# Patient Record
Sex: MALE | Race: WHITE
[De-identification: names, ages, dates, MRNs, and addresses within clinical notes are randomized per-mention and may not be internally consistent; named-entity substitution may affect disease eponyms.]

---

## 2018-02-17 ENCOUNTER — HOSPITAL ENCOUNTER (EMERGENCY)
Dept: HOSPITAL 17 - NEPE | Age: 83
Discharge: HOME | End: 2018-02-17
Payer: COMMERCIAL

## 2018-02-17 VITALS — RESPIRATION RATE: 16 BRPM | SYSTOLIC BLOOD PRESSURE: 123 MMHG | DIASTOLIC BLOOD PRESSURE: 59 MMHG

## 2018-02-17 VITALS
OXYGEN SATURATION: 98 % | SYSTOLIC BLOOD PRESSURE: 104 MMHG | DIASTOLIC BLOOD PRESSURE: 70 MMHG | RESPIRATION RATE: 16 BRPM | TEMPERATURE: 98.6 F | HEART RATE: 88 BPM

## 2018-02-17 VITALS — SYSTOLIC BLOOD PRESSURE: 113 MMHG | DIASTOLIC BLOOD PRESSURE: 59 MMHG

## 2018-02-17 VITALS — BODY MASS INDEX: 22.4 KG/M2 | HEIGHT: 72 IN | WEIGHT: 165.35 LBS

## 2018-02-17 DIAGNOSIS — I48.92: ICD-10-CM

## 2018-02-17 DIAGNOSIS — I48.91: ICD-10-CM

## 2018-02-17 DIAGNOSIS — R42: ICD-10-CM

## 2018-02-17 DIAGNOSIS — R53.1: ICD-10-CM

## 2018-02-17 DIAGNOSIS — I10: ICD-10-CM

## 2018-02-17 DIAGNOSIS — R11.2: ICD-10-CM

## 2018-02-17 DIAGNOSIS — R55: Primary | ICD-10-CM

## 2018-02-17 LAB
ALBUMIN SERPL-MCNC: 3.3 GM/DL (ref 3.4–5)
ALP SERPL-CCNC: 58 U/L (ref 45–117)
ALT SERPL-CCNC: 19 U/L (ref 12–78)
AMORPHOUS SEDIMENT, URINE: (no result)
AST SERPL-CCNC: 19 U/L (ref 15–37)
BASOPHILS # BLD AUTO: 0.1 TH/MM3 (ref 0–0.2)
BASOPHILS NFR BLD: 0.9 % (ref 0–2)
BILIRUB SERPL-MCNC: 0.5 MG/DL (ref 0.2–1)
BUN SERPL-MCNC: 15 MG/DL (ref 7–18)
CALCIUM SERPL-MCNC: 9.1 MG/DL (ref 8.5–10.1)
CHLORIDE SERPL-SCNC: 105 MEQ/L (ref 98–107)
COLOR UR: YELLOW
CREAT SERPL-MCNC: 1.02 MG/DL (ref 0.6–1.3)
EOSINOPHIL # BLD: 0.3 TH/MM3 (ref 0–0.4)
EOSINOPHIL NFR BLD: 4.2 % (ref 0–4)
ERYTHROCYTE [DISTWIDTH] IN BLOOD BY AUTOMATED COUNT: 13.5 % (ref 11.6–17.2)
GFR SERPLBLD BASED ON 1.73 SQ M-ARVRAT: 70 ML/MIN (ref 89–?)
GLUCOSE SERPL-MCNC: 118 MG/DL (ref 74–106)
GLUCOSE UR STRIP-MCNC: (no result) MG/DL
HCO3 BLD-SCNC: 28.5 MEQ/L (ref 21–32)
HCT VFR BLD CALC: 42.7 % (ref 39–51)
HGB BLD-MCNC: 14.6 GM/DL (ref 13–17)
HGB UR QL STRIP: (no result)
HYALINE CASTS #/AREA URNS LPF: 8 /LPF
KETONES UR STRIP-MCNC: (no result) MG/DL
LYMPHOCYTES # BLD AUTO: 1.2 TH/MM3 (ref 1–4.8)
LYMPHOCYTES NFR BLD AUTO: 17.3 % (ref 9–44)
MAGNESIUM SERPL-MCNC: 2.3 MG/DL (ref 1.5–2.5)
MCH RBC QN AUTO: 32.6 PG (ref 27–34)
MCHC RBC AUTO-ENTMCNC: 34.1 % (ref 32–36)
MCV RBC AUTO: 95.7 FL (ref 80–100)
MONOCYTE #: 0.7 TH/MM3 (ref 0–0.9)
MONOCYTES NFR BLD: 9.9 % (ref 0–8)
MUCOUS THREADS #/AREA URNS LPF: (no result) /LPF
NEUTROPHILS # BLD AUTO: 4.5 TH/MM3 (ref 1.8–7.7)
NEUTROPHILS NFR BLD AUTO: 67.7 % (ref 16–70)
NITRITE UR QL STRIP: (no result)
PLATELET # BLD: 245 TH/MM3 (ref 150–450)
PMV BLD AUTO: 8.4 FL (ref 7–11)
PROT SERPL-MCNC: 7 GM/DL (ref 6.4–8.2)
RBC # BLD AUTO: 4.46 MIL/MM3 (ref 4.5–5.9)
SODIUM SERPL-SCNC: 140 MEQ/L (ref 136–145)
SP GR UR STRIP: 1.02 (ref 1–1.03)
SQUAMOUS #/AREA URNS HPF: 1 /HPF (ref 0–5)
URINE LEUKOCYTE ESTERASE: (no result)
WBC # BLD AUTO: 6.7 TH/MM3 (ref 4–11)

## 2018-02-17 PROCEDURE — 81001 URINALYSIS AUTO W/SCOPE: CPT

## 2018-02-17 PROCEDURE — 85025 COMPLETE CBC W/AUTO DIFF WBC: CPT

## 2018-02-17 PROCEDURE — 83735 ASSAY OF MAGNESIUM: CPT

## 2018-02-17 PROCEDURE — 93005 ELECTROCARDIOGRAM TRACING: CPT

## 2018-02-17 PROCEDURE — 82550 ASSAY OF CK (CPK): CPT

## 2018-02-17 PROCEDURE — 99284 EMERGENCY DEPT VISIT MOD MDM: CPT

## 2018-02-17 PROCEDURE — 80053 COMPREHEN METABOLIC PANEL: CPT

## 2018-02-17 NOTE — PD
HPI


Chief Complaint:  Dizziness


Time Seen by Provider:  10:12


Travel History


International Travel<30 days:  No


Contact w/Intl Traveler<30days:  No


Traveled to known affect area:  No





History of Present Illness


HPI


The patient is a 82-year-old  male who presents emergency department 

for near syncope.  The patient was cleaning out the litter box at home when he 

became lightheaded, dizzy, diaphoretic, and somewhat nauseated.  The patient 

sat down, had what sounds like a near syncopal event, symptoms last 

approximately 10-15 minutes.  The patient called his cardiologist and spoke 

with the on-call cardiologist, Dr. Loera, who advised him to come the 

emergency department for further evaluation.  The patient is status post 

cardiac ablation 1 week ago at the [facility] for age fibrillation.  The 

patient is currently on Xarelto and Propafenone.  The patient does state he has 

some nausea with dry heaves and diaphoresis during the near syncopal event, 

however, states he is currently asymptomatic.  The patient denied any chest 

pain or shortness of breath.  He denies any focal deficits of the upper or 

lower extremities.





PFSH


Past Medical History


Asthma:  No


Atrial Fibrillation:  Yes


Heart Rhythm Problems:  Yes (AFLUTTER)


Cancer:  No


Cardiovascular Problems:  Yes


High Cholesterol:  Yes


Chest Pain:  No


Congestive Heart Failure:  No


COPD:  No


Cerebrovascular Accident:  No


Diminished Hearing:  Yes


Endocrine:  No


Gastrointestinal Disorders:  No


Genitourinary:  No


Headaches:  No


Hypertension:  Yes


Immune Disorder:  No


Implanted Vascular Access Dvce:  No


Musculoskeletal:  No


Neurologic:  Yes


Psychiatric:  No


Reproductive:  No


Respiratory:  Yes (SOB)


Immunizations Current:  Yes (SHINGLES 2015)


Migraines:  No


Seizures:  No


Sleep Apnea:  No


Influenza Vaccination:  Yes





Past Surgical History


Cardiac Surgery:  Yes (CRYRO-ABLATION)


Other Surgery:  No





Social History


Alcohol Use:  No


Tobacco Use:  No


Substance Use:  No





Allergies-Medications


(Allergen,Severity, Reaction):  


Coded Allergies:  


     No Known Allergies (Unverified , 1/25/16)


Reported Meds & Prescriptions





Reported Meds & Active Scripts


Active








Review of Systems


Except as stated in HPI:  all other systems reviewed are Neg


General / Constitutional:  No: Fever


HENT:  Positive: Lightheadedness


Cardiovascular:  Positive: Irregular Rhythm (history of atrial fibrillation 

status post cardiac ablation), Diaphoresis, Syncope, No: Chest Pain or 

Discomfort


Gastrointestinal:  Positive: Nausea, Vomiting, No: Abdominal Pain


Musculoskeletal:  Positive: Weakness


Neurologic:  Positive: Dizziness, Syncope





Physical Exam


Narrative


GENERAL: Awake, alert, pleasant 82-year-old male who appears his stated age and 

is in no acute respiratory distress.


SKIN: Focused skin assessment warm/dry.


HEAD: Atraumatic. Normocephalic. 


EYES: Pupils equal and round.  4 mm bilateral and reactive.


ENT: No nasal bleeding or discharge.  Mucous membranes pink and moist.


NECK: Trachea midline. No JVD. 


CARDIOVASCULAR: Regular rate and rhythm.  No murmur appreciated.  Heart rate in 

the 80s.


RESPIRATORY: No accessory muscle use. Clear to auscultation. Breath sounds 

equal bilaterally. 


GASTROINTESTINAL: Abdomen soft, non-tender, nondistended.  No rebound 

tenderness.


MUSCULOSKELETAL: Ecchymosis noted right inguinal area after ablation 1 week 

ago.  Positive right femoral pulse.


NEUROLOGICAL: Awake and alert. No obvious cranial nerve deficits.  Motor 

grossly within normal limits. Normal speech.  NIHSS 0.  Nonfocal.  No drift of 

the upper or lower extremities.  Sensation is symmetric bilaterally.  Smile is 

symmetric.  Patient is oriented 5.


PSYCHIATRIC: Appropriate mood and affect; insight and judgment normal.





Data


Data


Last Documented VS





Vital Signs








  Date Time  Temp Pulse Resp B/P (MAP) Pulse Ox O2 Delivery O2 Flow Rate FiO2


 


2/17/18 11:43  81 14 130/60 (83)    





  75 14 123/59 (80)    





  81 16 113/59 (77)    


 


2/17/18 09:54     99 Room Air  


 


2/17/18 09:47 98.6       








Orders





 Orders


Complete Blood Count With Diff (2/17/18 10:24)


Comprehensive Metabolic Panel (2/17/18 10:24)


Magnesium (Mg) (2/17/18 10:24)


Ckmb (Isoenzyme) Profile (2/17/18 10:24)


Urinalysis - C+S If Indicated (2/17/18 10:24)


Ecg Monitoring (2/17/18 10:24)


Iv Access Insert/Monitor (2/17/18 10:24)


Oximetry (2/17/18 10:24)


Sodium Chloride 0.9% Flush (Ns Flush) (2/17/18 10:30)


Orthostatic Vital Signs (2/17/18 10:24)


Sodium Chlorid 0.9% 500 Ml Inj (Ns 500 M (2/17/18 10:30)


Electrocardiogram (2/17/18 09:59)





Labs





Laboratory Tests








Test


  2/17/18


10:41


 


White Blood Count 6.7 TH/MM3 


 


Red Blood Count 4.46 MIL/MM3 


 


Hemoglobin 14.6 GM/DL 


 


Hematocrit 42.7 % 


 


Mean Corpuscular Volume 95.7 FL 


 


Mean Corpuscular Hemoglobin 32.6 PG 


 


Mean Corpuscular Hemoglobin


Concent 34.1 % 


 


 


Red Cell Distribution Width 13.5 % 


 


Platelet Count 245 TH/MM3 


 


Mean Platelet Volume 8.4 FL 


 


Neutrophils (%) (Auto) 67.7 % 


 


Lymphocytes (%) (Auto) 17.3 % 


 


Monocytes (%) (Auto) 9.9 % 


 


Eosinophils (%) (Auto) 4.2 % 


 


Basophils (%) (Auto) 0.9 % 


 


Neutrophils # (Auto) 4.5 TH/MM3 


 


Lymphocytes # (Auto) 1.2 TH/MM3 


 


Monocytes # (Auto) 0.7 TH/MM3 


 


Eosinophils # (Auto) 0.3 TH/MM3 


 


Basophils # (Auto) 0.1 TH/MM3 


 


CBC Comment DIFF FINAL 


 


Differential Comment  


 


Urine Color YELLOW 


 


Urine Turbidity HAZY 


 


Urine pH 6.5 


 


Urine Specific Gravity 1.016 


 


Urine Protein TRACE mg/dL 


 


Urine Glucose (UA) NEG mg/dL 


 


Urine Ketones NEG mg/dL 


 


Urine Occult Blood NEG 


 


Urine Nitrite NEG 


 


Urine Bilirubin NEG 


 


Urine Urobilinogen


  LESS THAN 2.0


MG/DL


 


Urine Leukocyte Esterase NEG 


 


Urine RBC


  LESS THAN 1


/hpf


 


Urine WBC 1 /hpf 


 


Urine Squamous Epithelial


Cells 1 /hpf 


 


 


Urine Amorphous Sediment FEW 


 


Urine Hyaline Casts 8 /lpf 


 


Urine Granular Casts 11 /lpf 


 


Urine Mucus FEW /lpf 


 


Microscopic Urinalysis Comment


  CULT NOT


INDICATED


 


Blood Urea Nitrogen 15 MG/DL 


 


Creatinine 1.02 MG/DL 


 


Random Glucose 118 MG/DL 


 


Total Protein 7.0 GM/DL 


 


Albumin 3.3 GM/DL 


 


Calcium Level 9.1 MG/DL 


 


Magnesium Level 2.3 MG/DL 


 


Alkaline Phosphatase 58 U/L 


 


Aspartate Amino Transf


(AST/SGOT) 19 U/L 


 


 


Alanine Aminotransferase


(ALT/SGPT) 19 U/L 


 


 


Total Bilirubin 0.5 MG/DL 


 


Sodium Level 140 MEQ/L 


 


Potassium Level 3.9 MEQ/L 


 


Chloride Level 105 MEQ/L 


 


Carbon Dioxide Level 28.5 MEQ/L 


 


Anion Gap 7 MEQ/L 


 


Estimat Glomerular Filtration


Rate 70 ML/MIN 


 


 


Total Creatine Kinase 99 U/L 











[facility]


Medical Decision Making


Medical Screen Exam Complete:  Yes


Emergency Medical Condition:  Yes


Medical Record Reviewed:  Yes


Interpretation(s)


EKG reveals sinus rhythm with a rate 80.  No ischemic changes or ectopy noted.








Laboratory Tests








Test


  2/17/18


10:41


 


White Blood Count 6.7 TH/MM3 


 


Red Blood Count 4.46 MIL/MM3 


 


Hemoglobin 14.6 GM/DL 


 


Hematocrit 42.7 % 


 


Mean Corpuscular Volume 95.7 FL 


 


Mean Corpuscular Hemoglobin 32.6 PG 


 


Mean Corpuscular Hemoglobin


Concent 34.1 % 


 


 


Red Cell Distribution Width 13.5 % 


 


Platelet Count 245 TH/MM3 


 


Mean Platelet Volume 8.4 FL 


 


Neutrophils (%) (Auto) 67.7 % 


 


Lymphocytes (%) (Auto) 17.3 % 


 


Monocytes (%) (Auto) 9.9 % 


 


Eosinophils (%) (Auto) 4.2 % 


 


Basophils (%) (Auto) 0.9 % 


 


Neutrophils # (Auto) 4.5 TH/MM3 


 


Lymphocytes # (Auto) 1.2 TH/MM3 


 


Monocytes # (Auto) 0.7 TH/MM3 


 


Eosinophils # (Auto) 0.3 TH/MM3 


 


Basophils # (Auto) 0.1 TH/MM3 


 


CBC Comment DIFF FINAL 


 


Differential Comment  


 


Urine Color YELLOW 


 


Urine Turbidity HAZY 


 


Urine pH 6.5 


 


Urine Specific Gravity 1.016 


 


Urine Protein TRACE mg/dL 


 


Urine Glucose (UA) NEG mg/dL 


 


Urine Ketones NEG mg/dL 


 


Urine Occult Blood NEG 


 


Urine Nitrite NEG 


 


Urine Bilirubin NEG 


 


Urine Urobilinogen


  LESS THAN 2.0


MG/DL


 


Urine Leukocyte Esterase NEG 


 


Urine RBC


  LESS THAN 1


/hpf


 


Urine WBC 1 /hpf 


 


Urine Squamous Epithelial


Cells 1 /hpf 


 


 


Urine Amorphous Sediment FEW 


 


Urine Hyaline Casts 8 /lpf 


 


Urine Granular Casts 11 /lpf 


 


Urine Mucus FEW /lpf 


 


Microscopic Urinalysis Comment


  CULT NOT


INDICATED


 


Blood Urea Nitrogen 15 MG/DL 


 


Creatinine 1.02 MG/DL 


 


Random Glucose 118 MG/DL 


 


Total Protein 7.0 GM/DL 


 


Albumin 3.3 GM/DL 


 


Calcium Level 9.1 MG/DL 


 


Magnesium Level 2.3 MG/DL 


 


Alkaline Phosphatase 58 U/L 


 


Aspartate Amino Transf


(AST/SGOT) 19 U/L 


 


 


Alanine Aminotransferase


(ALT/SGPT) 19 U/L 


 


 


Total Bilirubin 0.5 MG/DL 


 


Sodium Level 140 MEQ/L 


 


Potassium Level 3.9 MEQ/L 


 


Chloride Level 105 MEQ/L 


 


Carbon Dioxide Level 28.5 MEQ/L 


 


Anion Gap 7 MEQ/L 


 


Estimat Glomerular Filtration


Rate 70 ML/MIN 


 


 


Total Creatine Kinase 99 U/L 








Differential Diagnosis


Differential diagnosis includes near-syncope, arrhythmia, electrolyte 

abnormality, vasovagal syncope, cardiogenic sink be, dehydration, orthostatic 

hypotension.


Narrative Course


IV was established, labs are drawn and sent, and the patient was placed on 

cardiac telemetry monitoring and continuous pulse oximetry monitoring.  EKG was 

ordered and interpreted.  Orthostatic vital signs were obtained.  The patient 

was administered IV fluid bolus.  I discussed the patient with Dr. Loera at 10

:49 AM who recommends 23 hour observation to the medical service with consult 

to Dr. Loera.  I discussed these findings with the wife who is not happy, 

states she wants cardiology to evaluate the patient right away.  I had a 

discussion with the patient stating that I called the cardiologist prior to the 

results of any labs, however, he recommends 23 hour observation to the medical 

service with consult to cardiology.  The patient's wife states she will call 

his cardiologist personally, Dr. Dixon.





Telemetry monitoring revealed sinus rhythm, at one point it looks like possibly 

A. fib, however, patient was moving, it appeared to be artifact, when he 

stopped moving, it was normal sinus rhythm.





The patient's labs are unremarkable.  Orthostatic vital signs are unremarkable.

  Patient is reevaluated at 12:40 PM.  He is a dramatic.  Cardiology 

recommended 23 hour observation, however, the patient does not want to be a 23 

hour observation to the hospital.  He was to go home and follow-up with his 

cardiologist.  The patient will be provided a copy of his labs at discharge.  

He is advised to return if symptoms worsen or progress.





Diagnosis





 Primary Impression:  


 Near syncope


Patient Instructions:  General Instructions





***Additional Instructions:  


Please provide a patient a copy of his EKG and labs at discharge.  Follow-up 

with your cardiologist.  Return if symptoms worsen or progress.


***Med/Other Pt SpecificInfo:  No Change to Meds


Disposition:  01 DISCHARGE HOME


Condition:  Stable











Jose Luis Dai MD Feb 17, 2018 10:33

## 2018-02-17 NOTE — EKG
Date Performed: 2018       Time Performed: 09:59:00

 

PTAGE:      82 years

 

EKG:      Sinus rhythm 

 

 NORMAL ECG Since 

 

 PREVIOUS TRACING            , no significant change noted PREVIOUS TRACIN2012 06.04

 

DOCTOR:   Roshan Beal  Interpretating Date/Time  2018 14:39:05